# Patient Record
Sex: FEMALE | Race: WHITE | NOT HISPANIC OR LATINO | Employment: FULL TIME | URBAN - METROPOLITAN AREA
[De-identification: names, ages, dates, MRNs, and addresses within clinical notes are randomized per-mention and may not be internally consistent; named-entity substitution may affect disease eponyms.]

---

## 2020-12-10 ENCOUNTER — TRANSFERRED RECORDS (OUTPATIENT)
Dept: HEALTH INFORMATION MANAGEMENT | Facility: CLINIC | Age: 20
End: 2020-12-10

## 2020-12-22 ENCOUNTER — TRANSFERRED RECORDS (OUTPATIENT)
Dept: HEALTH INFORMATION MANAGEMENT | Facility: CLINIC | Age: 20
End: 2020-12-22

## 2022-01-06 ENCOUNTER — TRANSCRIBE ORDERS (OUTPATIENT)
Dept: OTHER | Age: 22
End: 2022-01-06

## 2022-01-06 DIAGNOSIS — Q28.3 CEREBRAL CAVERNOUS MALFORMATION: Primary | ICD-10-CM

## 2022-08-27 NOTE — TELEPHONE ENCOUNTER
9/6/2022-Request for images and records faxed to [facility] @ 631am    9/9/2022-2nd request for Records and Images faxed to [facility] @ 757am  -Response from [facility] saying patient never seen In their clinic. Called and asked patient to call and have records and Images faxed [facility] @ 1121am    9/13/2022-[facility] records scanned to chart-MR @ 733am  -Request for images to be mailed faxed to [facility] @ 927am  -Per  Inez Images were mailed 9/13-MR @ 136pm    Action 09.16.2022 RM 1:48P   Action Taken Images received from Juan José Inez took to 4n to be uploaded to chart.         FUTURE VISIT INFORMATION      FUTURE VISIT INFORMATION:    Date: 9/20/2022    Time: 920am    [place] [facility] [place]  REFERRAL INFORMATION:    Referring provider:  Dr. Child    Referring providers clinic:  [facility]     Reason for visit/diagnosis  Cerebral Cavernous Malformation     RECORDS REQUESTED FROM:       Clinic name Comments Records Status Imaging Status   [facility]  Requested  Requested

## 2022-09-19 ASSESSMENT — ENCOUNTER SYMPTOMS
VOMITING: 0
CONSTIPATION: 1
NERVOUS/ANXIOUS: 1
RECTAL PAIN: 0
DEPRESSION: 1
ABDOMINAL PAIN: 1
HEARTBURN: 0
PANIC: 0
DECREASED CONCENTRATION: 0
NAUSEA: 1
BLOOD IN STOOL: 0
INSOMNIA: 0
JAUNDICE: 0
BOWEL INCONTINENCE: 0
DIARRHEA: 0
BLOATING: 0

## 2022-09-20 ENCOUNTER — OFFICE VISIT (OUTPATIENT)
Dept: NEUROSURGERY | Facility: CLINIC | Age: 22
End: 2022-09-20
Payer: COMMERCIAL

## 2022-09-20 ENCOUNTER — PRE VISIT (OUTPATIENT)
Dept: NEUROSURGERY | Facility: CLINIC | Age: 22
End: 2022-09-20

## 2022-09-20 VITALS
OXYGEN SATURATION: 98 % | SYSTOLIC BLOOD PRESSURE: 130 MMHG | HEART RATE: 87 BPM | WEIGHT: 136 LBS | DIASTOLIC BLOOD PRESSURE: 84 MMHG | RESPIRATION RATE: 16 BRPM

## 2022-09-20 DIAGNOSIS — Q28.3 CEREBRAL CAVERNOMA: Primary | ICD-10-CM

## 2022-09-20 PROCEDURE — 99207 PR NO DOCUMENTATION ON VISIT: CPT | Performed by: NEUROLOGICAL SURGERY

## 2022-09-20 RX ORDER — ONDANSETRON 4 MG/1
TABLET, ORALLY DISINTEGRATING ORAL
COMMUNITY
Start: 2022-09-10 | End: 2022-11-14

## 2022-09-20 ASSESSMENT — PAIN SCALES - GENERAL: PAINLEVEL: NO PAIN (0)

## 2022-09-20 NOTE — PATIENT INSTRUCTIONS
MRI  Brain without contrast, patient will be called with results.      Call Bridget RN  Neurosurgery Care Coordinator with questions/concerns     Thank you for using [facility]

## 2022-09-20 NOTE — LETTER
9/20/2022       RE: Ayleen العراقي  [address] [facility] [address]  [place] [address]     Dear Colleague,    Thank you for referring your patient, Ayleen العراقي, to the [facility] NEUROSURGERY CLINIC [place] [facility]. Please see a copy of my visit note below.    Endovascular surgical neuroradiology note    22-year-old woman with chronic sinusitis found to have a incidental right frontal lobe cavernous malformation during sinus surgery work-up presenting for a initial appointment in the neurosurgery clinic to establish care.    The patient reports that her cavernous malformation was diagnosed incidentally in 2018 and has been monitored radiographically with MRI scans performed every 1-2 years.  The patient denies ever having clinical symptoms that could be attributed to the cavernous malformation.  She does note that on 1 occasion she developed transient tingling sensation in her left upper extremity but these occurred after she was sitting in a chair and reading for an extended period of time.  This episode resolved spontaneously and was not accompanied by any motor deficits.  She denies a history of any other focal neurological symptoms.  She denies a family history of cavernous malformations, stroke, intracranial hemorrhage.    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  The reason for her visit to the neurosurgery clinic today is to establish a local neurosurgical follow-up.    /84   Pulse 87   Resp 16   Wt 61.7 kg (136 lb)   SpO2 98%   On exam, she is awake, alert and oriented to person, place, time and situation.  Language exam normal.  No dysarthria.  No obvious gaze preference or neglect.  Face symmetric.  Tongue and uvula midline.  Sensation intact to touch bilaterally over the face.  Shoulder shrug within normal.  Motor strength is 5/5 in all 4 extremities.   Sensation intact to touch bilaterally symmetrical.  No obvious dysmetria on finger-to-nose test.    We discussed her neuroimaging findings and the risks versus benefits of surgical resection of the cavernous malformation.  At this point, given the stability of this incidental finding, the patient agrees with continued conservative management.    Plan:  MRI brain without contrast followed by a telephone call    Discussed with Dr. Schulz    Endovascular surgical neuroradiology fellow  Pager [phone]          Again, thank you for allowing me to participate in the care of your patient.      Sincerely,    Hung Schulz MD

## 2022-09-20 NOTE — PROGRESS NOTES
Endovascular surgical neuroradiology note    22-year-old woman with chronic sinusitis found to have a incidental right frontal lobe cavernous malformation during sinus surgery work-up presenting for a initial appointment in the neurosurgery clinic to establish care.    The patient reports that her cavernous malformation was diagnosed incidentally in 2018 and has been monitored radiographically with MRI scans performed every 1-2 years.  The patient denies ever having clinical symptoms that could be attributed to the cavernous malformation.  She does note that on 1 occasion she developed transient tingling sensation in her left upper extremity but these occurred after she was sitting in a chair and reading for an extended period of time.  This episode resolved spontaneously and was not accompanied by any motor deficits.  She denies a history of any other focal neurological symptoms.  She denies a family history of cavernous malformations, stroke, intracranial hemorrhage.    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  The reason for her visit to the neurosurgery clinic today is to establish a local neurosurgical follow-up.    /84   Pulse 87   Resp 16   Wt 61.7 kg (136 lb)   SpO2 98%   On exam, she is awake, alert and oriented to person, place, time and situation.  Language exam normal.  No dysarthria.  No obvious gaze preference or neglect.  Face symmetric.  Tongue and uvula midline.  Sensation intact to touch bilaterally over the face.  Shoulder shrug within normal.  Motor strength is 5/5 in all 4 extremities.  Sensation intact to touch bilaterally symmetrical.  No obvious dysmetria on finger-to-nose test.    We discussed her neuroimaging findings and the risks versus benefits of surgical resection of the cavernous malformation.  At this point, given the stability of this incidental finding, the patient agrees with continued conservative  management.    Plan:  MRI brain without contrast followed by a telephone call    Discussed with Dr. Schulz    Endovascular surgical neuroradiology fellow  Pager [phone]

## 2022-10-03 ENCOUNTER — HEALTH MAINTENANCE LETTER (OUTPATIENT)
Age: 22
End: 2022-10-03

## 2022-10-16 ENCOUNTER — ANCILLARY PROCEDURE (OUTPATIENT)
Dept: MRI IMAGING | Facility: CLINIC | Age: 22
End: 2022-10-16
Attending: NEUROLOGICAL SURGERY
Payer: COMMERCIAL

## 2022-10-16 DIAGNOSIS — Q28.3 CEREBRAL CAVERNOMA: ICD-10-CM

## 2022-10-16 PROCEDURE — 70551 MRI BRAIN STEM W/O DYE: CPT | Mod: GC | Performed by: RADIOLOGY

## 2022-11-14 ENCOUNTER — OFFICE VISIT (OUTPATIENT)
Dept: PEDIATRICS | Facility: CLINIC | Age: 22
End: 2022-11-14
Payer: COMMERCIAL

## 2022-11-14 VITALS
TEMPERATURE: 98.3 F | WEIGHT: 132 LBS | SYSTOLIC BLOOD PRESSURE: 104 MMHG | BODY MASS INDEX: 22.53 KG/M2 | OXYGEN SATURATION: 98 % | HEART RATE: 94 BPM | DIASTOLIC BLOOD PRESSURE: 78 MMHG | RESPIRATION RATE: 16 BRPM | HEIGHT: 64 IN

## 2022-11-14 DIAGNOSIS — Z83.79 FAMILY HISTORY OF CELIAC DISEASE: ICD-10-CM

## 2022-11-14 DIAGNOSIS — F41.1 GAD (GENERALIZED ANXIETY DISORDER): ICD-10-CM

## 2022-11-14 DIAGNOSIS — R11.0 NAUSEA: ICD-10-CM

## 2022-11-14 DIAGNOSIS — F33.41 RECURRENT MAJOR DEPRESSIVE DISORDER, IN PARTIAL REMISSION (H): ICD-10-CM

## 2022-11-14 DIAGNOSIS — Z76.89 ENCOUNTER TO ESTABLISH CARE: Primary | ICD-10-CM

## 2022-11-14 LAB
CHOLEST SERPL-MCNC: 175 MG/DL
FASTING STATUS PATIENT QL REPORTED: YES
HDLC SERPL-MCNC: 60 MG/DL
LDLC SERPL CALC-MCNC: 100 MG/DL
NONHDLC SERPL-MCNC: 115 MG/DL
TRIGL SERPL-MCNC: 75 MG/DL

## 2022-11-14 PROCEDURE — 36415 COLL VENOUS BLD VENIPUNCTURE: CPT | Performed by: NURSE PRACTITIONER

## 2022-11-14 PROCEDURE — 86364 TISS TRNSGLTMNASE EA IG CLAS: CPT | Performed by: NURSE PRACTITIONER

## 2022-11-14 PROCEDURE — 82784 ASSAY IGA/IGD/IGG/IGM EACH: CPT | Performed by: NURSE PRACTITIONER

## 2022-11-14 PROCEDURE — 99203 OFFICE O/P NEW LOW 30 MIN: CPT | Performed by: NURSE PRACTITIONER

## 2022-11-14 PROCEDURE — 80061 LIPID PANEL: CPT | Performed by: NURSE PRACTITIONER

## 2022-11-14 RX ORDER — ONDANSETRON 4 MG/1
4 TABLET, ORALLY DISINTEGRATING ORAL EVERY 8 HOURS PRN
Qty: 30 TABLET | Refills: 0 | Status: SHIPPED | OUTPATIENT
Start: 2022-11-14 | End: 2023-11-08

## 2022-11-14 RX ORDER — SERTRALINE HYDROCHLORIDE 25 MG/1
25 TABLET, FILM COATED ORAL DAILY
Qty: 30 TABLET | Refills: 1 | Status: SHIPPED | OUTPATIENT
Start: 2022-11-14 | End: 2022-12-19 | Stop reason: DRUGHIGH

## 2022-11-14 ASSESSMENT — PAIN SCALES - GENERAL: PAINLEVEL: NO PAIN (0)

## 2022-11-14 ASSESSMENT — ENCOUNTER SYMPTOMS: NERVOUS/ANXIOUS: 1

## 2022-11-14 NOTE — PROGRESS NOTES
Assessment & Plan     Encounter to establish care  Histories and medications reviewed and updated.   - Lipid panel reflex to direct LDL Fasting    KATHERINE (generalized anxiety disorder)  Recurrent major depressive disorder, in partial remission (H)  Start sertraline 25 mg daily. Discussed side effects to watch for. If tolerating okay without side effects, can increase to 50 mg after 1-2 weeks. If experiencing side effects, can decrease to half tab (12.5 mg). Follow-up virtual visit in 4-6 weeks, sooner if concerns.   - sertraline (ZOLOFT) 25 MG tablet; Take 1 tablet (25 mg) by mouth daily    Nausea  Refilled. Takes zofran once every 2 weeks.  - ondansetron (ZOFRAN ODT) 4 MG ODT tab; Take 1 tablet (4 mg) by mouth every 8 hours as needed for nausea    Family history of celiac disease  Sister with celiac and RA.  - Tissue transglutaminase gloria IgA and IgG  - IgA      24 minutes spent on the date of the encounter doing chart review, patient visit and documentation        MEDICATIONS:        - Start taking sertraline       - Continue other medications without change  FUTURE APPOINTMENTS:       - Follow-up visit in 4-6 weeks    Return in about 4 weeks (around 12/12/2022) for Follow-up, Video Visit, Depression and/or Anxiety.    GWEN Aranda [facility] Abbasi is a 22 year old, presenting for the following health issues:  Establish Care (Concerns to continue care, anxiety & depression, no longer taking medication but would like to discuss options.)      #Anxiety  #Depression  Anxiety > depression. History of GI issues which she feels are related to anxiety - recurrent nausea. Was on prozac in the past, wasn't helping her and she felt it made her sick so she weaned off. No other medications tried previously. Recently did have a visit with psychiatrist, didn't go well. Was recommended to restart prozac at prior dose. She does have a therapist, sessions every 2 weeks. Sister  takes fluoxetine for depression.    #Cavernoma  History of cavernoma, discovered during sinus surgery. Recently established with neurosurgeon.     There is no problem list on file for this patient.    Past Medical History:   Diagnosis Date     Cavernoma      Depression      KATHERINE (generalized anxiety disorder)      Past Surgical History:   Procedure Laterality Date     CHOLECYSTECTOMY  2021     SINUS SURGERY  2019     Family History   Problem Relation Age of Onset     Thyroid Cancer Mother      Asthma Father      Prostate Cancer Father      Testicular cancer Father      Celiac Disease Sister      Rheumatoid Arthritis Sister      Social History     Socioeconomic History     Marital status: Single     Spouse name: Not on file     Number of children: Not on file     Years of education: Not on file     Highest education level: Not on file   Occupational History     Not on file   Tobacco Use     Smoking status: Never     Smokeless tobacco: Never   Substance and Sexual Activity     Alcohol use: Yes     Comment: occationally     Drug use: Never     Sexual activity: Not Currently     Partners: Male     Birth control/protection: I.U.D.     Comment: IUD placed March 2022.   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     Lives in [place] - has a roommate, also a nurse.     Free time: hike, outdoors, knitting    Originally from [facility] [place]. Sister  a boy who's family is from [place]. Now her entire family lives in [place].         Ita Moreira, DNP, APRN, CNP    11/14/22     Social Determinants of Health     Financial Resource Strain: Not on file   Food Insecurity: Not on file   Transportation Needs: Not on file   Physical Activity: Not on file   Stress: Not on file   Social Connections: Not on file   Intimate Partner Violence: Not on file   Housing Stability: Not on file     Current Outpatient Medications   Medication     ondansetron (ZOFRAN ODT) 4 MG ODT tab      "sertraline (ZOLOFT) 25 MG tablet     omeprazole (PRILOSEC) 20 MG DR capsule     No current facility-administered medications for this visit.      No Known Allergies      Review of Systems   Psychiatric/Behavioral: The patient is nervous/anxious.           Objective    /78 (BP Location: Right arm, Patient Position: Sitting, Cuff Size: Adult Regular)   Pulse 94   Temp 98.3  F (36.8  C) (Tympanic)   Resp 16   Ht 1.626 m (5' 4\")   Wt 59.9 kg (132 lb)   LMP  (LMP Unknown)   SpO2 98%   BMI 22.66 kg/m    Body mass index is 22.66 kg/m .  Physical Exam  Constitutional:       General: She is not in acute distress.     Appearance: Normal appearance. She is not ill-appearing or toxic-appearing.   Cardiovascular:      Rate and Rhythm: Normal rate.   Pulmonary:      Effort: Pulmonary effort is normal. No respiratory distress.   Neurological:      General: No focal deficit present.      Mental Status: She is alert and oriented to person, place, and time.   Psychiatric:         Mood and Affect: Mood normal.         Behavior: Behavior normal.         Thought Content: Thought content normal.         Judgment: Judgment normal.                            "

## 2022-11-15 LAB — IGA SERPL-MCNC: 325 MG/DL (ref 84–499)

## 2022-11-16 LAB
TTG IGA SER-ACNC: 0.8 U/ML
TTG IGG SER-ACNC: 0.8 U/ML

## 2022-12-05 ENCOUNTER — MYC MEDICAL ADVICE (OUTPATIENT)
Dept: PEDIATRICS | Facility: CLINIC | Age: 22
End: 2022-12-05

## 2022-12-05 DIAGNOSIS — F33.41 RECURRENT MAJOR DEPRESSIVE DISORDER, IN PARTIAL REMISSION (H): ICD-10-CM

## 2022-12-05 DIAGNOSIS — F41.1 GAD (GENERALIZED ANXIETY DISORDER): ICD-10-CM

## 2022-12-05 RX ORDER — SERTRALINE HYDROCHLORIDE 25 MG/1
50 TABLET, FILM COATED ORAL DAILY
Qty: 30 TABLET | Refills: 1 | Status: CANCELLED | OUTPATIENT
Start: 2022-12-05

## 2022-12-05 NOTE — TELEPHONE ENCOUNTER
Patient sent Telestream message stating that she attempted to increase her zoloft prescription to 50mg but her insurance will only allow 1 tablet a day dosing. Please reorder zoloft prescription for 50mg tablets.     Wendy Washington RN on 12/5/2022 at 7:25 AM

## 2022-12-18 ASSESSMENT — ENCOUNTER SYMPTOMS
FEVER: 0
HEMATOCHEZIA: 0
HEADACHES: 0
HEMATURIA: 0
DYSURIA: 0
ABDOMINAL PAIN: 0
JOINT SWELLING: 0
WEAKNESS: 0
CHILLS: 0
DIZZINESS: 0
CONSTIPATION: 0
NAUSEA: 0
ARTHRALGIAS: 0
EYE PAIN: 0
FREQUENCY: 0
PALPITATIONS: 0
SORE THROAT: 0
BREAST MASS: 0
NERVOUS/ANXIOUS: 0
SHORTNESS OF BREATH: 0
PARESTHESIAS: 0
HEARTBURN: 0
MYALGIAS: 0
DIARRHEA: 0
COUGH: 0

## 2022-12-19 ENCOUNTER — VIRTUAL VISIT (OUTPATIENT)
Dept: PEDIATRICS | Facility: CLINIC | Age: 22
End: 2022-12-19
Payer: COMMERCIAL

## 2022-12-19 DIAGNOSIS — F41.1 GAD (GENERALIZED ANXIETY DISORDER): Primary | ICD-10-CM

## 2022-12-19 PROCEDURE — 99213 OFFICE O/P EST LOW 20 MIN: CPT | Mod: 95 | Performed by: NURSE PRACTITIONER

## 2022-12-19 NOTE — PROGRESS NOTES
Ayleen is a 22 year old who is being evaluated via a billable video visit.      How would you like to obtain your AVS? MyChart  If the video visit is dropped, the invitation should be resent by: Text to cell phone: [phone]  Will anyone else be joining your video visit? No          Assessment & Plan     KATHERINE (generalized anxiety disorder)  Improved. Doing great on current dose of sertraline. Plan to continue without changes. Does not need refills at this time, will refill upon pharmacy request. She will follow-up as needed if wishing to increase her dose at all, an e-visit would be reasonable for this.         7 minutes spent on the date of the encounter doing chart review, patient visit and documentation        MEDICATIONS:  Continue current medications without change  FUTURE APPOINTMENTS:       - Follow-up for annual visit or as needed    Follow-up: Return to clinic if symptoms fail to improve, worsen, or new symptoms develop with the above treatment plan.     GWEN Aranda CNP  [facility]    Subjective      Ayleen is a 22 year old, presenting for the following health issues:  Recheck Medication      HPI     Presents for follow-up.     Started on sertraline 11/14/2022. She is taking 50 mg daily as prescribed without side effects. Anxiety is much improved, no longer experiencing recurrent nausea. Continues with counseling every 2 weeks.     Patient Active Problem List   Diagnosis     KATHERINE (generalized anxiety disorder)     Past Medical History:   Diagnosis Date     Cavernoma      Depression      KATHERINE (generalized anxiety disorder)      Current Outpatient Medications   Medication     omeprazole (PRILOSEC) 20 MG DR capsule     ondansetron (ZOFRAN ODT) 4 MG ODT tab     sertraline (ZOLOFT) 50 MG tablet     No current facility-administered medications for this visit.      No Known Allergies      Review of Systems    ROS: 10 point ROS neg other than the symptoms noted above in the HPI.         Objective       Vitals:  No vitals were obtained today due to virtual visit.    Physical Exam   GENERAL: Healthy, alert and no distress  EYES: Eyes grossly normal to inspection.  No discharge or erythema, or obvious scleral/conjunctival abnormalities.  RESP: No audible wheeze, cough, or visible cyanosis.  No visible retractions or increased work of breathing.    SKIN: Visible skin clear. No significant rash, abnormal pigmentation or lesions.  NEURO: Cranial nerves grossly intact.  Mentation and speech appropriate for age.  PSYCH: Mentation appears normal, affect normal/bright, judgement and insight intact, normal speech and appearance well-groomed.            Video-Visit Details    Video Start Time: 3:25 PM    Type of service:  Video Visit    Video End Time:3:29 PM    Originating Location (pt. Location): Home        Distant Location (provider location):  On-site    Platform used for Video Visit: Burt

## 2023-07-03 ENCOUNTER — TELEPHONE (OUTPATIENT)
Dept: NEUROSURGERY | Facility: CLINIC | Age: 23
End: 2023-07-03
Payer: COMMERCIAL

## 2023-07-05 DIAGNOSIS — Q28.3 CEREBRAL CAVERNOMA: Primary | ICD-10-CM

## 2023-07-09 ENCOUNTER — OFFICE VISIT (OUTPATIENT)
Dept: URGENT CARE | Facility: URGENT CARE | Age: 23
End: 2023-07-09
Payer: COMMERCIAL

## 2023-07-09 VITALS
OXYGEN SATURATION: 97 % | TEMPERATURE: 98.4 F | RESPIRATION RATE: 16 BRPM | SYSTOLIC BLOOD PRESSURE: 117 MMHG | DIASTOLIC BLOOD PRESSURE: 80 MMHG | WEIGHT: 146 LBS | BODY MASS INDEX: 25.06 KG/M2 | HEART RATE: 74 BPM

## 2023-07-09 DIAGNOSIS — J01.40 ACUTE PANSINUSITIS, RECURRENCE NOT SPECIFIED: ICD-10-CM

## 2023-07-09 DIAGNOSIS — H92.02 OTALGIA, LEFT: Primary | ICD-10-CM

## 2023-07-09 DIAGNOSIS — H65.193 ACUTE MEE (MIDDLE EAR EFFUSION), BILATERAL: ICD-10-CM

## 2023-07-09 LAB — DEPRECATED S PYO AG THROAT QL EIA: NEGATIVE

## 2023-07-09 PROCEDURE — 87651 STREP A DNA AMP PROBE: CPT | Performed by: STUDENT IN AN ORGANIZED HEALTH CARE EDUCATION/TRAINING PROGRAM

## 2023-07-09 PROCEDURE — 87635 SARS-COV-2 COVID-19 AMP PRB: CPT | Performed by: STUDENT IN AN ORGANIZED HEALTH CARE EDUCATION/TRAINING PROGRAM

## 2023-07-09 PROCEDURE — 99213 OFFICE O/P EST LOW 20 MIN: CPT | Performed by: STUDENT IN AN ORGANIZED HEALTH CARE EDUCATION/TRAINING PROGRAM

## 2023-07-10 LAB
GROUP A STREP BY PCR: NOT DETECTED
SARS-COV-2 RNA RESP QL NAA+PROBE: NEGATIVE

## 2023-07-10 NOTE — PROGRESS NOTES
Patient presents with:  Urgent Care: While using abimael pot patient states she heard and felt a lout pop in her left ear. Pain radiates to jaw. Sinus pain and pressure x 5 days      Clinical Decision Making:      ICD-10-CM    1. Otalgia, left  H92.02       2. Acute KALPANA (middle ear effusion), bilateral  H65.193       3. Acute pansinusitis, recurrence not specified  J01.40 Symptomatic COVID-19 Virus (Coronavirus) by PCR Nose     Streptococcus A Rapid Screen w/Reflex to PCR - Clinic Collect     Group A Streptococcus PCR Throat Swab      24 yo F with sinus infection here for left ear popping. Exam with intact TM bilaterally, middle ear effusion, inflamed nasal turbinates, mild pharyngeal erythema and hypertrophy. Patient reassured, recommended gently nasal clearing and cough. No concern for AOM, lungs clear. Rapid strep neg. COVID pending. Discussed red flags for immediate return to clinic/ER, as well as indications for follow up if no improvement. Patient understood and agreed to plan.     There are no Patient Instructions on file for this visit.      HPI:  Ayleen العراقي is a 23 year old female who presents today complaining of left ear popping sensation while blowing nose. No ear discharge however pain in the back and decreased hearing.   Sinus infection: on going for past 4 days, using netti pot and afrin for congestion.   No fever, n/v, abdominal pain  No home covid test, would like to be tested,  Works as pediatric RN    History obtained from the patient. Here with roommate who was recently sick.     Problem List:  2022-12: KATHERINE (generalized anxiety disorder)      Past Medical History:   Diagnosis Date     Cavernoma      Depression      KATHERINE (generalized anxiety disorder)        Social History     Tobacco Use     Smoking status: Never     Smokeless tobacco: Never   Substance Use Topics     Alcohol use: Yes     Comment: occationally       Review of Systems    Vitals:    07/09/23 1911   BP: 117/80   Pulse: 74    Resp: 16   Temp: 98.4  F (36.9  C)   TempSrc: Oral   SpO2: 97%   Weight: 66.2 kg (146 lb)       Physical Exam  Constitutional:       General: She is not in acute distress.     Appearance: Normal appearance. She is not ill-appearing, toxic-appearing or diaphoretic.   HENT:      Head: Normocephalic and atraumatic.      Right Ear: No tenderness. A middle ear effusion is present. No mastoid tenderness. Tympanic membrane is not perforated, erythematous or bulging. Tympanic membrane has normal mobility.      Left Ear: Ear canal normal. Decreased hearing noted. Tenderness present. No laceration, drainage or swelling. A middle ear effusion is present. There is no impacted cerumen. No foreign body. There is mastoid tenderness. Tympanic membrane is not perforated, erythematous, retracted or bulging. Tympanic membrane has normal mobility.      Nose: Mucosal edema present.      Right Turbinates: Enlarged, swollen and pale.      Left Turbinates: Enlarged, swollen and pale.      Right Sinus: No maxillary sinus tenderness or frontal sinus tenderness.      Left Sinus: No maxillary sinus tenderness or frontal sinus tenderness.      Mouth/Throat:      Pharynx: Uvula midline. Posterior oropharyngeal erythema present. No pharyngeal swelling, oropharyngeal exudate or uvula swelling.      Tonsils: 1+ on the right. 2+ on the left.   Eyes:      Extraocular Movements: Extraocular movements intact.      Conjunctiva/sclera: Conjunctivae normal.   Cardiovascular:      Rate and Rhythm: Normal rate.      Pulses: Normal pulses.   Pulmonary:      Effort: Pulmonary effort is normal.      Breath sounds: Normal breath sounds.   Skin:     General: Skin is warm.      Capillary Refill: Capillary refill takes less than 2 seconds.   Neurological:      General: No focal deficit present.      Mental Status: She is alert.         Labs:  Results for orders placed or performed in visit on 07/09/23   Streptococcus A Rapid Screen w/Reflex to PCR - Clinic  Collect     Status: Normal    Specimen: Throat; Swab   Result Value Ref Range    Group A Strep antigen Negative Negative         Keesha Saucedo MD

## 2023-09-11 ENCOUNTER — ANCILLARY PROCEDURE (OUTPATIENT)
Dept: MRI IMAGING | Facility: CLINIC | Age: 23
End: 2023-09-11
Attending: NEUROLOGICAL SURGERY
Payer: COMMERCIAL

## 2023-09-11 DIAGNOSIS — Q28.3 CEREBRAL CAVERNOMA: ICD-10-CM

## 2023-09-11 PROCEDURE — 70551 MRI BRAIN STEM W/O DYE: CPT | Mod: GC | Performed by: RADIOLOGY

## 2023-09-19 ENCOUNTER — VIRTUAL VISIT (OUTPATIENT)
Dept: NEUROSURGERY | Facility: CLINIC | Age: 23
End: 2023-09-19
Attending: NEUROLOGICAL SURGERY
Payer: COMMERCIAL

## 2023-09-19 VITALS — BODY MASS INDEX: 23.9 KG/M2 | WEIGHT: 140 LBS | HEIGHT: 64 IN

## 2023-09-19 DIAGNOSIS — Q28.3 CEREBRAL CAVERNOMA: Primary | ICD-10-CM

## 2023-09-19 PROCEDURE — 99212 OFFICE O/P EST SF 10 MIN: CPT | Mod: 93 | Performed by: NEUROLOGICAL SURGERY

## 2023-09-19 ASSESSMENT — PAIN SCALES - GENERAL: PAINLEVEL: NO PAIN (0)

## 2023-09-19 NOTE — PROGRESS NOTES
Virtual Visit Details    Type of service:  Telephone Visit   Phone call duration: 10 minutes     [facility]  Department of Neurosurgery      Name: Ayleen العراقي  MRN: [id number]  Age: 23 year old  : 2000  Referring provider: Hung Schulz  2023      Chief Complaint:   Right frontal cavernous malformation  Follow-up after imaging    History of Present Illness:   Ayleen العراقي is a 23 year old female with a history of chronic sinusitis who is seen today for a follow-up regarding right frontal cavernous malformation.  This was incidentally found during sinus surgery work-up in 2018.    Most recent visit with Dr. Schulz on 2022.  Given the similar to all of the right frontal cavernous malformation on serial imaging, conservative management with serial imaging was recommended.    Today I had a phone visit with the patient.  She denies any new neurological symptoms or concerns.  She completed a brain MRI recently.  Please see the results below.    Review of Systems:   Pertinent items are noted in HPI or as in patient entered ROS below, remainder of complete ROS is negative.        No data to display               Imagin2023 MRI brain:  Impression:  1.  Grossly unchanged large cavernous malformation centered within the  inferomedial right frontal lobe with unchanged adjacent hemosiderin  staining.  2.  Stable developmental venous anomaly in the right basal ganglia.  3.  Left frontoethmoid mucosal thickening and fluid, potentially  sinusitis.       Assessment:  Right frontal cavernous malformation  Follow-up after imaging    Plan:  Recent imaging shows stable cavernous malformation in the right frontal lobe.  She also has a stable developmental venous anomaly in the right basal ganglia.  We will tentatively plan for a brain MRI in 1 year and to follow-up in clinic after.  We will review imaging with Dr. Schulz and will contact the patient if there is any additional  recommendations.     Addendum on 9/20/2023: Recent brain MRI shows stable right frontal cavernous malformation.  Patient to follow-up in 2 years after brain MRI.    I spent  minutes on patient care activities related to this encounter on the date of service, including time spent reviewing the chart, obtaining history and examination and in counseling the patient, and in documentation in the electronic medical record.      Luz Maria HIDALGO CNP  Department of Neurosurgery

## 2023-09-19 NOTE — NURSING NOTE
Is the patient currently in the state of [place]? YES    Visit mode:TELEPHONE    If the visit is dropped, the patient can be reconnected by: TELEPHONE VISIT: Phone number:   Telephone Information:   Mobile [phone]       Will anyone else be joining the visit? NO  (If patient encounters technical issues they should call [phone])    How would you like to obtain your AVS? MyChart    Are changes needed to the allergy or medication list? Pt stated no changes to allergies and Pt stated no med changes    Reason for visit: WESTON Perez VVF

## 2023-09-19 NOTE — LETTER
2023       RE: Ayleen العراقي  [address]  [facility] [address]     Dear Colleague,    Thank you for referring your patient, Ayleen العراقي, to the [facility]UR[facility] [place] [facility]. Please see a copy of my visit note below.    Virtual Visit Details    Type of service:  Telephone Visit   Phone call duration: 10 minutes     [facility]  Department of Neurosurgery      Name: Ayleen العراقي  MRN: [id number]  Age: 23 year old  : 2000  Referring provider: Hung Schulz  2023      Chief Complaint:   Right frontal cavernous malformation  Follow-up after imaging    History of Present Illness:   Ayleen العراقي is a 23 year old female with a history of chronic sinusitis who is seen today for a follow-up regarding right frontal cavernous malformation.  This was incidentally found during sinus surgery work-up in 2018.    Most recent visit with Dr. Schulz on 2022.  Given the similar to all of the right frontal cavernous malformation on serial imaging, conservative management with serial imaging was recommended.    Today I had a phone visit with the patient.  She denies any new neurological symptoms or concerns.  She completed a brain MRI recently.  Please see the results below.    Review of Systems:   Pertinent items are noted in HPI or as in patient entered ROS below, remainder of complete ROS is negative.        No data to display               Imagin2023 MRI brain:  Impression:  1.  Grossly unchanged large cavernous malformation centered within the  inferomedial right frontal lobe with unchanged adjacent hemosiderin  staining.  2.  Stable developmental venous anomaly in the right basal ganglia.  3.  Left frontoethmoid mucosal thickening and fluid, potentially  sinusitis.       Assessment:  Right frontal cavernous malformation  Follow-up after imaging    Plan:  Recent imaging  shows stable cavernous malformation in the right frontal lobe.  She also has a stable developmental venous anomaly in the right basal ganglia.  We will tentatively plan for a brain MRI in 1 year and to follow-up in clinic after.  We will review imaging with Dr. Schulz and will contact the patient if there is any additional recommendations.       I spent  minutes on patient care activities related to this encounter on the date of service, including time spent reviewing the chart, obtaining history and examination and in counseling the patient, and in documentation in the electronic medical record.          Again, thank you for allowing me to participate in the care of your patient.      Sincerely,    Hung Schulz MD

## 2023-09-21 ENCOUNTER — MYC MEDICAL ADVICE (OUTPATIENT)
Dept: NEUROSURGERY | Facility: CLINIC | Age: 23
End: 2023-09-21
Payer: COMMERCIAL

## 2023-10-22 ENCOUNTER — HEALTH MAINTENANCE LETTER (OUTPATIENT)
Age: 23
End: 2023-10-22

## 2024-04-18 ENCOUNTER — OFFICE VISIT (OUTPATIENT)
Dept: URGENT CARE | Facility: URGENT CARE | Age: 24
End: 2024-04-18
Payer: COMMERCIAL

## 2024-04-18 VITALS
SYSTOLIC BLOOD PRESSURE: 119 MMHG | HEART RATE: 75 BPM | RESPIRATION RATE: 16 BRPM | TEMPERATURE: 97.4 F | OXYGEN SATURATION: 97 % | DIASTOLIC BLOOD PRESSURE: 80 MMHG

## 2024-04-18 DIAGNOSIS — R07.0 THROAT PAIN: Primary | ICD-10-CM

## 2024-04-18 LAB — DEPRECATED S PYO AG THROAT QL EIA: NEGATIVE

## 2024-04-18 PROCEDURE — 99213 OFFICE O/P EST LOW 20 MIN: CPT | Performed by: FAMILY MEDICINE

## 2024-04-18 PROCEDURE — 87651 STREP A DNA AMP PROBE: CPT | Performed by: FAMILY MEDICINE

## 2024-04-18 NOTE — PROGRESS NOTES
SUBJECTIVE:   Ayleen العراقي is a 23 year old female presenting with a chief complaint of sore throat.  Had chills and sweat initially.  Had bump on tonsil.  No cough or congestion  Onset of symptoms was 2 day(s) ago.  Course of illness is worsening.    Severity moderate  Current and Associated symptoms: sore throat, chills  Treatment measures tried include Fluids, Rest, and tylenol, ibuprofen.  Predisposing factors include None.    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    Had mono when younger    Past Medical History:   Diagnosis Date    Cavernoma     Depression     KATHERINE (generalized anxiety disorder)      Current Outpatient Medications   Medication Sig Dispense Refill    sertraline (ZOLOFT) 50 MG tablet TAKE 1 TABLET(50 MG) BY MOUTH DAILY 90 tablet 3    omeprazole (PRILOSEC) 20 MG DR capsule 1 cap(s) orally once a day for 30 day(s) (Patient not taking: Reported on 4/18/2024)      ondansetron (ZOFRAN ODT) 4 MG ODT tab Take 1 tablet (4 mg) by mouth every 8 hours as needed for nausea (Patient not taking: Reported on 4/18/2024) 30 tablet 0     Social History     Tobacco Use    Smoking status: Never    Smokeless tobacco: Never   Substance Use Topics    Alcohol use: Yes     Comment: occationally       ROS:  Review of systems negative except as stated above.    OBJECTIVE:  /80   Pulse 75   Temp 97.4  F (36.3  C)   Resp 16   SpO2 97%   GENERAL APPEARANCE: healthy, alert and no distress  EYES: EOMI,  PERRL, conjunctiva clear  HENT: tonsil mild pink, no exudates, uvula midline  RESP: lungs with no audible wheezes or increase work of breathing  PSYCH: mentation appears normal and affect normal/bright    Results for orders placed or performed in visit on 04/18/24   Streptococcus A Rapid Screen w/Reflex to PCR - Clinic Collect     Status: Normal    Specimen: Throat; Swab   Result Value Ref Range    Group A Strep antigen Negative Negative         ASSESSMENT/PLAN:  (R07.0) Throat pain  (primary encounter  diagnosis)  Comment: viral  Plan: Streptococcus A Rapid Screen w/Reflex to PCR -         Clinic Collect, Group A Streptococcus PCR         Throat Swab            Reassurance given, reviewed symptomatic treatment with tylenol, ibuprofen, plenty of fluids and rest.  Will follow up on throat culture and treat if positive for strep.  Offered COVID PCR, declined and will obtain home rapid COVID test    Follow up with primary provider if no improvement of symptoms in 1 week    Bhaskar Booker MD  April 18, 2024 6:27 PM

## 2024-04-19 LAB — GROUP A STREP BY PCR: NOT DETECTED

## 2024-04-20 ENCOUNTER — HOSPITAL ENCOUNTER (EMERGENCY)
Facility: CLINIC | Age: 24
Discharge: HOME | End: 2024-04-20
Attending: PHYSICIAN ASSISTANT | Admitting: PHYSICIAN ASSISTANT
Payer: COMMERCIAL

## 2024-04-20 VITALS
WEIGHT: 151.24 LBS | DIASTOLIC BLOOD PRESSURE: 85 MMHG | SYSTOLIC BLOOD PRESSURE: 125 MMHG | OXYGEN SATURATION: 99 % | HEIGHT: 64 IN | RESPIRATION RATE: 16 BRPM | TEMPERATURE: 99.8 F | HEART RATE: 100 BPM | BODY MASS INDEX: 25.82 KG/M2

## 2024-04-20 DIAGNOSIS — F33.41 RECURRENT MAJOR DEPRESSIVE DISORDER, IN PARTIAL REMISSION (H): ICD-10-CM

## 2024-04-20 DIAGNOSIS — J03.90 ACUTE TONSILLITIS, UNSPECIFIED ETIOLOGY: ICD-10-CM

## 2024-04-20 DIAGNOSIS — F41.1 GAD (GENERALIZED ANXIETY DISORDER): ICD-10-CM

## 2024-04-20 LAB
FLUAV RNA SPEC QL NAA+PROBE: NEGATIVE
FLUBV RNA RESP QL NAA+PROBE: NEGATIVE
GROUP A STREP BY PCR: NOT DETECTED
RSV RNA SPEC NAA+PROBE: NEGATIVE
SARS-COV-2 RNA RESP QL NAA+PROBE: NEGATIVE

## 2024-04-20 PROCEDURE — 87651 STREP A DNA AMP PROBE: CPT | Performed by: PHYSICIAN ASSISTANT

## 2024-04-20 PROCEDURE — 250N000013 HC RX MED GY IP 250 OP 250 PS 637: Performed by: PHYSICIAN ASSISTANT

## 2024-04-20 PROCEDURE — 99283 EMERGENCY DEPT VISIT LOW MDM: CPT

## 2024-04-20 PROCEDURE — 87637 SARSCOV2&INF A&B&RSV AMP PRB: CPT | Performed by: PHYSICIAN ASSISTANT

## 2024-04-20 RX ADMIN — Medication 10 MG: at 19:41

## 2024-04-20 ASSESSMENT — COLUMBIA-SUICIDE SEVERITY RATING SCALE - C-SSRS
2. HAVE YOU ACTUALLY HAD ANY THOUGHTS OF KILLING YOURSELF IN THE PAST MONTH?: NO
1. IN THE PAST MONTH, HAVE YOU WISHED YOU WERE DEAD OR WISHED YOU COULD GO TO SLEEP AND NOT WAKE UP?: NO
6. HAVE YOU EVER DONE ANYTHING, STARTED TO DO ANYTHING, OR PREPARED TO DO ANYTHING TO END YOUR LIFE?: NO

## 2024-04-20 ASSESSMENT — ACTIVITIES OF DAILY LIVING (ADL): ADLS_ACUITY_SCORE: 35

## 2024-04-20 NOTE — ED TRIAGE NOTES
Pt presents to ED with fever, body aches, chills, sore throat. Symptoms have been going on since Tuesday. Seen at , tested negative for strep at that time. Home flu/covid test negative. Denies cough, nasal congestion. Denies urinary symptoms. Took ibuprofen at 4:45. Temp prior to this 101.6.      Triage Assessment (Adult)       Row Name 04/20/24 1828          Triage Assessment    Airway WDL WDL        Skin Circulation/Temperature WDL    Skin Circulation/Temperature WDL WDL

## 2024-04-20 NOTE — ED PROVIDER NOTES
History     Chief Complaint:  Fever       HPI   Ayleen العراقي is a 23 year old female who presents for evaluation of a fever. Her symptoms initially began 5 days ago as a scratchy throat which has progressively worsened alongside fevers, chills, and myalgias developing 3 days ago. She had one episode of diarrhea 2 days ago with no recurrence. She was seen at  2 days ago and tested negative for strep, and she did an at-home Influenza and Covid test which was also negative. Tmax of 101.6 today. She has been taking Tylenol and ibuprofen. She denies rhinorrhea, congestion, ear pain, vomiting, urinary symptoms, or rashes. She denies concern for gonorrhea or chlamydia.      Independent Historian:   None - Patient Only    Review of External Notes:    Visit on 4/18/24 - Negative dstrep   SUBJECTIVE:   Ayleen العراقي is a 23 year old female presenting with a chief complaint of sore throat.  Had chills and sweat initially.  Had bump on tonsil.  No cough or congestion  Onset of symptoms was 2 day(s) ago.  Course of illness is worsening.    Severity moderate  Current and Associated symptoms: sore throat, chills  Treatment measures tried include Fluids, Rest, and tylenol, ibuprofen.  Predisposing factors include None    ASSESSMENT/PLAN:  (R07.0) Throat pain  (primary encounter diagnosis)  Comment: viral  Plan: Streptococcus A Rapid Screen w/Reflex to PCR -         Clinic Collect, Group A Streptococcus PCR         Throat Swab          Reassurance given, reviewed symptomatic treatment with tylenol, ibuprofen, plenty of fluids and rest.  Will follow up on throat culture and treat if positive for strep.  Offered COVID PCR, declined and will obtain home rapid COVID test     Follow up with primary provider if no improvement of symptoms in 1 week    Medications:    Zoloft    Past Medical History:    Cavernoma  Depression  Anxiety    Past Surgical History:    Cholecystectomy  Septoplasty    Physical Exam   Patient Vitals for  "the past 24 hrs:   BP Temp Temp src Pulse Resp SpO2 Height Weight   04/20/24 1832 -- 99.8  F (37.7  C) -- -- -- -- -- --   04/20/24 1831 -- -- -- -- -- -- 1.626 m (5' 4\") 68.6 kg (151 lb 3.8 oz)   04/20/24 1825 125/85 99.8  F (37.7  C) Oral 117 18 99 % -- --        Physical Exam  Constitutional: Pleasant. Cooperative.   Eyes: Pupils equally round and reactive  HENT: Head is normal in appearance.  Moist mucous membranes. Oropharyngeal erythema. 2+ left tonsil with exudates, 0+ right tonsil.  No palatal asymmetry. Uvula midline. No trismus. No muffled voice. Tolerating their secretions.  Cardiovascular: Regular rate and rhythm.  Respiratory: Normal respiratory effort, lungs are clear bilaterally.  Neck: Normal ROM. Left sided anterior cervical lymphadenopathy.   Skin: Normal, without rash.  Neurologic: Cranial nerves grossly intact. Alert.  Nursing notes and vital signs reviewed.      Emergency Department Course     Laboratory:  Labs Ordered and Resulted from Time of ED Arrival to Time of ED Departure   INFLUENZA A/B, RSV, & SARS-COV2 PCR - Normal       Result Value    Influenza A PCR Negative      Influenza B PCR Negative      RSV PCR Negative      SARS CoV2 PCR Negative     GROUP A STREPTOCOCCUS PCR THROAT SWAB - Normal    Group A strep by PCR Not Detected          Emergency Department Course & Assessments:    Interventions:  Medications   dexAMETHasone (DECADRON) alcohol-free oral solution 10 mg (has no administration in time range)        Assessments:  1852 I obtained history and examined the patient, as noted above.  1924 I rechecked and updated the patient.    Independent Interpretation (X-rays, CTs, rhythm strip):  None    Consultations/Discussion of Management or Tests:  None        Social Determinants of Health affecting care:   Patient is a registered nurse.    Disposition:  The patient was discharged.     Impression & Plan    CMS Diagnoses: None       Medical Decision Making:  Ayleen العراقي is a 23 year " old female who presents to the ED for evaluation of of pharyngitis.  See HPI as above for additional details.  Vitals and physical exam as above.  Differential is broad include strep pharyngitis, COVID, influenza, RSV, PTA, RPA, epiglottitis, amongst others.  Strep, COVID, influenza, RSV swabs returned negative.  Left tonsil is markedly enlarged, however no evidence at this point in time for PTA.  Lower suspicion for remainder of differential currently.  Do not feel that labs and imaging would be of benefit.  Will treat patient at this time for presumed bacterial tonsillitis with antibiotics as below.  Dose of Decadron provided for pain improvement.  No suggestion for alternative etiology of fever such as otitis media, pneumonia, intra-abdominal pathology such as appendicitis, UTI, meningitis, cellulitis, amongst others.  Do feel patient safe for discharge home.  Follow-up with ENT with persistent symptoms despite antibiotics as per referral. Discussed reasons to return. All questions answered. Patient discharged to home in stable condition.    Diagnosis:    ICD-10-CM    1. Acute tonsillitis, unspecified etiology  J03.90 Adult ENT  Referral           Discharge Medications:  New Prescriptions    AMOXICILLIN-CLAVULANATE (AUGMENTIN) 875-125 MG TABLET    Take 1 tablet by mouth 2 times daily for 10 days          Scribe Disclosure:  I, George Mccauley, am serving as a scribe at 6:56 PM on 4/20/2024 to document services personally performed by Allen Centeno PA-C based on my observations and the provider's statements to me.   4/20/2024   Allen Centeno PA-C     This record was created at least in part using electronic voice recognition software, so please excuse any typographical errors.       Allen Centeno PA-C  04/20/24 1937

## 2024-04-21 NOTE — DISCHARGE INSTRUCTIONS
Take full course of antibiotics as prescribed.  Use Tylenol and ibuprofen for pain control.  Stay hydrated.

## 2024-05-21 SDOH — HEALTH STABILITY: PHYSICAL HEALTH: ON AVERAGE, HOW MANY DAYS PER WEEK DO YOU ENGAGE IN MODERATE TO STRENUOUS EXERCISE (LIKE A BRISK WALK)?: 2 DAYS

## 2024-05-21 SDOH — HEALTH STABILITY: PHYSICAL HEALTH: ON AVERAGE, HOW MANY MINUTES DO YOU ENGAGE IN EXERCISE AT THIS LEVEL?: 60 MIN

## 2024-05-21 ASSESSMENT — PATIENT HEALTH QUESTIONNAIRE - PHQ9
SUM OF ALL RESPONSES TO PHQ QUESTIONS 1-9: 3
10. IF YOU CHECKED OFF ANY PROBLEMS, HOW DIFFICULT HAVE THESE PROBLEMS MADE IT FOR YOU TO DO YOUR WORK, TAKE CARE OF THINGS AT HOME, OR GET ALONG WITH OTHER PEOPLE: NOT DIFFICULT AT ALL
SUM OF ALL RESPONSES TO PHQ QUESTIONS 1-9: 3

## 2024-05-21 ASSESSMENT — SOCIAL DETERMINANTS OF HEALTH (SDOH): HOW OFTEN DO YOU GET TOGETHER WITH FRIENDS OR RELATIVES?: TWICE A WEEK

## 2024-05-22 ENCOUNTER — OFFICE VISIT (OUTPATIENT)
Dept: PEDIATRICS | Facility: CLINIC | Age: 24
End: 2024-05-22
Payer: COMMERCIAL

## 2024-05-22 VITALS
DIASTOLIC BLOOD PRESSURE: 67 MMHG | RESPIRATION RATE: 18 BRPM | WEIGHT: 143.6 LBS | HEART RATE: 89 BPM | OXYGEN SATURATION: 96 % | TEMPERATURE: 98 F | HEIGHT: 64 IN | BODY MASS INDEX: 24.52 KG/M2 | SYSTOLIC BLOOD PRESSURE: 96 MMHG

## 2024-05-22 DIAGNOSIS — Z23 ENCOUNTER FOR ADMINISTRATION OF COVID-19 VACCINE: ICD-10-CM

## 2024-05-22 DIAGNOSIS — F33.41 RECURRENT MAJOR DEPRESSIVE DISORDER, IN PARTIAL REMISSION (H): ICD-10-CM

## 2024-05-22 DIAGNOSIS — Z11.3 ROUTINE SCREENING FOR STI (SEXUALLY TRANSMITTED INFECTION): ICD-10-CM

## 2024-05-22 DIAGNOSIS — F41.1 GAD (GENERALIZED ANXIETY DISORDER): ICD-10-CM

## 2024-05-22 DIAGNOSIS — Z00.00 ENCOUNTER FOR PREVENTATIVE ADULT HEALTH CARE EXAMINATION: Primary | ICD-10-CM

## 2024-05-22 DIAGNOSIS — Z12.4 CERVICAL CANCER SCREENING: ICD-10-CM

## 2024-05-22 PROCEDURE — 99213 OFFICE O/P EST LOW 20 MIN: CPT | Mod: 25 | Performed by: NURSE PRACTITIONER

## 2024-05-22 PROCEDURE — 99395 PREV VISIT EST AGE 18-39: CPT | Mod: 25 | Performed by: NURSE PRACTITIONER

## 2024-05-22 PROCEDURE — 90480 ADMN SARSCOV2 VAC 1/ONLY CMP: CPT | Performed by: NURSE PRACTITIONER

## 2024-05-22 PROCEDURE — G0145 SCR C/V CYTO,THINLAYER,RESCR: HCPCS | Performed by: NURSE PRACTITIONER

## 2024-05-22 PROCEDURE — 87591 N.GONORRHOEAE DNA AMP PROB: CPT | Performed by: NURSE PRACTITIONER

## 2024-05-22 PROCEDURE — 87491 CHLMYD TRACH DNA AMP PROBE: CPT | Performed by: NURSE PRACTITIONER

## 2024-05-22 PROCEDURE — 91320 SARSCV2 VAC 30MCG TRS-SUC IM: CPT | Performed by: NURSE PRACTITIONER

## 2024-05-22 ASSESSMENT — PAIN SCALES - GENERAL: PAINLEVEL: NO PAIN (0)

## 2024-05-22 NOTE — PROGRESS NOTES
Preventive Care Visit  [facility] GWEN Levy CNP, Family Medicine  May 22, 2024      Assessment & Plan     Encounter for preventative adult health care examination  Age appropriate screening and preventative care have been addressed today. Vaccinations have been updated. Patient has been advised to undertake routine aerobic activity. Recommend annual vision exams as well as biannual dental exams. They will follow up for annual physical again in one year.     Cervical cancer screening  - Pap Screen Reflex to HPV if ASCUS - Recommended Age 25 - 29 Years    Routine screening for STI (sexually transmitted infection)  - NEISSERIA GONORRHOEA PCR  - CHLAMYDIA TRACHOMATIS PCR    KATHERINE (generalized anxiety disorder)  Recurrent major depressive disorder, in partial remission (H24)  Chronic, stable. Taking sertraline as prescribed. No concerns. Plan to continue at current dose without changes. Will follow-up annually or as needed. Refills not needed today but will continue to refill on demand.   - OFFICE/OUTPT VISIT,YAJAIRA JEONG III    Encounter for administration of COVID-19 vaccine  - COVID-19 12+ (2023-24) (PFIZER)            Counseling  Appropriate preventive services were discussed with this patient, including applicable screening as appropriate for fall prevention, nutrition, physical activity, Tobacco-use cessation, weight loss and cognition.  Checklist reviewing preventive services available has been given to the patient.  Reviewed patient's diet, addressing concerns and/or questions.   She is at risk for lack of exercise and has been provided with information to increase physical activity for the benefit of her well-being.       Lucretia Abbasi is a 23 year old, presenting for the following:  Physical        5/22/2024     9:22 AM   Additional Questions   Roomed by Delmer mckeon   Accompanied by Self         5/22/2024   Forms   Any forms needing to be completed Yes         5/22/2024     9:22 AM    Patient Reported Additional Medications   Patient reports taking the following new medications No        Health Care Directive  Patient does not have a Health Care Directive or Living Will: Patient states has Advance Directive and will bring in a copy to clinic.    HPI        5/21/2024   General Health   How would you rate your overall physical health? Good   Feel stress (tense, anxious, or unable to sleep) Not at all         5/21/2024   Nutrition   Three or more servings of calcium each day? Yes   Diet: Regular (no restrictions)   How many servings of fruit and vegetables per day? (!) 2-3   How many sweetened beverages each day? 0-1         5/21/2024   Exercise   Days per week of moderate/strenous exercise 2 days   Average minutes spent exercising at this level 60 min   (!) EXERCISE CONCERN      5/21/2024   Social Factors   Frequency of gathering with friends or relatives Twice a week   Worry food won't last until get money to buy more No   Food not last or not have enough money for food? No   Do you have housing?  Yes   Are you worried about losing your housing? No   Lack of transportation? No   Unable to get utilities (heat,electricity)? No         5/21/2024   Dental   Dentist two times every year? Yes         5/21/2024   TB Screening   Were you born outside of the [place]? No       Today's PHQ-9 Score:       5/21/2024     3:14 PM   PHQ-9 SCORE   PHQ-9 Total Score MyChart 3 (Minimal depression)   PHQ-9 Total Score 3         5/21/2024   Substance Use   Alcohol more than 3/day or more than 7/wk No   Do you use any other substances recreationally? (!) ALCOHOL     Social History     Tobacco Use    Smoking status: Never    Smokeless tobacco: Never   Substance Use Topics    Alcohol use: Yes     Comment: 1-2 drinks per week    Drug use: Never          Mammogram Screening - Patient under 40 years of age: Routine Mammogram Screening not recommended.           5/21/2024   One time HIV Screening   Previous HIV test? Yes         " 5/21/2024   STI Screening   New sexual partner(s) since last STI/HIV test? (!) YES      History of abnormal Pap smear: No - age 21-29 PAP every 3 years recommended           5/21/2024   Contraception/Family Planning   Questions about contraception or family planning No        Reviewed and updated as needed this visit by Provider                    Patient Active Problem List   Diagnosis    KATHERINE (generalized anxiety disorder)    Recurrent major depressive disorder, in partial remission (H24)     Past Surgical History:   Procedure Laterality Date    CHOLECYSTECTOMY  2021    SINUS SURGERY  2019       Social History     Tobacco Use    Smoking status: Never    Smokeless tobacco: Never   Substance Use Topics    Alcohol use: Yes     Comment: 1-2 drinks per week     Family History   Problem Relation Age of Onset    Thyroid Cancer Mother     Hypertension Mother     Other Cancer Mother         Thyroid cancer    Depression Mother     Thyroid Disease Mother     Asthma Father     Prostate Cancer Father     Testicular cancer Father     Diabetes Father     Celiac Disease Sister     Rheumatoid Arthritis Sister     Depression Sister                 Objective    Exam  BP 96/67 (BP Location: Right arm, Patient Position: Sitting, Cuff Size: Adult Regular)   Pulse 89   Temp 98  F (36.7  C) (Tympanic)   Resp 18   Ht 1.634 m (5' 4.33\")   Wt 65.1 kg (143 lb 9.6 oz)   SpO2 96%   BMI 24.40 kg/m     Estimated body mass index is 24.4 kg/m  as calculated from the following:    Height as of this encounter: 1.634 m (5' 4.33\").    Weight as of this encounter: 65.1 kg (143 lb 9.6 oz).    Physical Exam  Constitutional: appears to be in no acute distress, comfortable, pleasant.   Eyes: anicteric, conjunctiva clear without drainage or erythema. RUSH.   Ears, Nose and Throat: tympanic membranes gray with LR,  nose without nasal discharge. OP: no erythema to posterior pharynx, negative post nasal drainage, tonsils +1 no erythema or " exudate.  Neck: supple, thyroid palpable,not enlarged, no nodules   Breast: Exam deferred (deferred after discussion of exam options with patient, no symptoms or concerns).   Cardiovascular: regular rate and rhythm, normal S1 and S2, no murmurs, rubs or gallops, peripheral pulses full and symmetric; negative peripheral edema   Respiratory: Air entry throughout. Breathing pattern unlabored without the use of accessory muscles. Clear to auscultation A and P, no wheezes or crackles, normal breath sounds.    Gastrointestinal: rounded, soft. Positive bowel sounds x4, nontender, no masses.   Genitourinary: external genitalia is without lesions. Introitus is normal, vaginal walls pink and moist without lesions or evidence of trauma. There is no abnormal discharge from the cervix. Cervix is pink without polyps or lesions.  Musculoskeletal: full range of motion, no edema.   Skin: pink, turgor smooth and elastic. Negative for lesions or dryness.  Neurological: normal gait, no tremor.   Psychological: appropriate mood and affect.   Lymphatic: no cervical, axillary, supraclavicular, or infraclavicular lymphadenopathy.          Signed Electronically by: GWEN Aranda CNP

## 2024-05-23 LAB
C TRACH DNA SPEC QL NAA+PROBE: NEGATIVE
N GONORRHOEA DNA SPEC QL NAA+PROBE: NEGATIVE

## 2024-05-29 LAB
BKR LAB AP GYN ADEQUACY: NORMAL
BKR LAB AP GYN INTERPRETATION: NORMAL
BKR LAB AP HPV REFLEX: NORMAL
BKR LAB AP PREVIOUS ABNORMAL: NORMAL
PATH REPORT.COMMENTS IMP SPEC: NORMAL
PATH REPORT.COMMENTS IMP SPEC: NORMAL
PATH REPORT.RELEVANT HX SPEC: NORMAL

## 2024-09-10 DIAGNOSIS — F33.41 RECURRENT MAJOR DEPRESSIVE DISORDER, IN PARTIAL REMISSION (H): ICD-10-CM

## 2024-09-10 DIAGNOSIS — F41.1 GAD (GENERALIZED ANXIETY DISORDER): ICD-10-CM
